# Patient Record
Sex: MALE | Race: WHITE | NOT HISPANIC OR LATINO | Employment: FULL TIME | URBAN - METROPOLITAN AREA
[De-identification: names, ages, dates, MRNs, and addresses within clinical notes are randomized per-mention and may not be internally consistent; named-entity substitution may affect disease eponyms.]

---

## 2017-04-21 ENCOUNTER — LAB VISIT (OUTPATIENT)
Dept: LAB | Facility: HOSPITAL | Age: 42
End: 2017-04-21
Attending: FAMILY MEDICINE
Payer: COMMERCIAL

## 2017-04-21 ENCOUNTER — OFFICE VISIT (OUTPATIENT)
Dept: FAMILY MEDICINE | Facility: CLINIC | Age: 42
End: 2017-04-21
Attending: FAMILY MEDICINE
Payer: COMMERCIAL

## 2017-04-21 VITALS
OXYGEN SATURATION: 99 % | DIASTOLIC BLOOD PRESSURE: 76 MMHG | BODY MASS INDEX: 21.09 KG/M2 | HEART RATE: 64 BPM | SYSTOLIC BLOOD PRESSURE: 106 MMHG | HEIGHT: 70 IN | TEMPERATURE: 98 F | WEIGHT: 147.31 LBS

## 2017-04-21 DIAGNOSIS — Z00.00 LABORATORY EXAM ORDERED AS PART OF ROUTINE GENERAL MEDICAL EXAMINATION: ICD-10-CM

## 2017-04-21 DIAGNOSIS — Z00.00 ROUTINE GENERAL MEDICAL EXAMINATION AT A HEALTH CARE FACILITY: Primary | ICD-10-CM

## 2017-04-21 LAB
ALBUMIN SERPL BCP-MCNC: 4.2 G/DL
ALP SERPL-CCNC: 81 U/L
ALT SERPL W/O P-5'-P-CCNC: 18 U/L
ANION GAP SERPL CALC-SCNC: 6 MMOL/L
AST SERPL-CCNC: 23 U/L
BASOPHILS # BLD AUTO: 0.03 K/UL
BASOPHILS NFR BLD: 0.6 %
BILIRUB SERPL-MCNC: 1.9 MG/DL
BUN SERPL-MCNC: 14 MG/DL
CALCIUM SERPL-MCNC: 9.6 MG/DL
CHLORIDE SERPL-SCNC: 100 MMOL/L
CHOLEST/HDLC SERPL: 4.2 {RATIO}
CO2 SERPL-SCNC: 30 MMOL/L
COMPLEXED PSA SERPL-MCNC: 1.1 NG/ML
CREAT SERPL-MCNC: 0.8 MG/DL
DIFFERENTIAL METHOD: ABNORMAL
EOSINOPHIL # BLD AUTO: 0.3 K/UL
EOSINOPHIL NFR BLD: 5.6 %
ERYTHROCYTE [DISTWIDTH] IN BLOOD BY AUTOMATED COUNT: 13.1 %
EST. GFR  (AFRICAN AMERICAN): >60 ML/MIN/1.73 M^2
EST. GFR  (NON AFRICAN AMERICAN): >60 ML/MIN/1.73 M^2
GLUCOSE SERPL-MCNC: 84 MG/DL
HCT VFR BLD AUTO: 48.3 %
HDL/CHOLESTEROL RATIO: 23.6 %
HDLC SERPL-MCNC: 250 MG/DL
HDLC SERPL-MCNC: 59 MG/DL
HGB BLD-MCNC: 16.9 G/DL
LDLC SERPL CALC-MCNC: 162 MG/DL
LYMPHOCYTES # BLD AUTO: 1.5 K/UL
LYMPHOCYTES NFR BLD: 28.9 %
MCH RBC QN AUTO: 33.1 PG
MCHC RBC AUTO-ENTMCNC: 35 %
MCV RBC AUTO: 95 FL
MONOCYTES # BLD AUTO: 0.4 K/UL
MONOCYTES NFR BLD: 8.8 %
NEUTROPHILS # BLD AUTO: 2.8 K/UL
NEUTROPHILS NFR BLD: 55.7 %
NONHDLC SERPL-MCNC: 191 MG/DL
PLATELET # BLD AUTO: 226 K/UL
PMV BLD AUTO: 9.9 FL
POTASSIUM SERPL-SCNC: 4.8 MMOL/L
PROT SERPL-MCNC: 7.6 G/DL
RBC # BLD AUTO: 5.11 M/UL
SODIUM SERPL-SCNC: 136 MMOL/L
T4 FREE SERPL-MCNC: 0.95 NG/DL
TRIGL SERPL-MCNC: 145 MG/DL
TSH SERPL DL<=0.005 MIU/L-ACNC: 1.79 UIU/ML
WBC # BLD AUTO: 5.02 K/UL

## 2017-04-21 PROCEDURE — 90471 IMMUNIZATION ADMIN: CPT | Mod: S$GLB,,, | Performed by: FAMILY MEDICINE

## 2017-04-21 PROCEDURE — 80061 LIPID PANEL: CPT

## 2017-04-21 PROCEDURE — 85025 COMPLETE CBC W/AUTO DIFF WBC: CPT

## 2017-04-21 PROCEDURE — 84153 ASSAY OF PSA TOTAL: CPT

## 2017-04-21 PROCEDURE — 99386 PREV VISIT NEW AGE 40-64: CPT | Mod: S$GLB,,, | Performed by: FAMILY MEDICINE

## 2017-04-21 PROCEDURE — 90715 TDAP VACCINE 7 YRS/> IM: CPT | Mod: S$GLB,,, | Performed by: FAMILY MEDICINE

## 2017-04-21 PROCEDURE — 36415 COLL VENOUS BLD VENIPUNCTURE: CPT | Mod: PO

## 2017-04-21 PROCEDURE — 80053 COMPREHEN METABOLIC PANEL: CPT

## 2017-04-21 PROCEDURE — 84439 ASSAY OF FREE THYROXINE: CPT

## 2017-04-21 PROCEDURE — 84443 ASSAY THYROID STIM HORMONE: CPT

## 2017-04-21 PROCEDURE — 99999 PR PBB SHADOW E&M-NEW PATIENT-LVL III: CPT | Mod: PBBFAC,,, | Performed by: FAMILY MEDICINE

## 2017-04-21 NOTE — PROGRESS NOTES
Subjective:       Patient ID: Zander Tovar is a 42 y.o. male.    Chief Complaint: Annual Exam    HPI     The patient presents to the office today requesting a routine periodic health examination.  This is his first visit with me.    There is no problem list on file for this patient.      Past Surgical History:   Procedure Laterality Date    CAUTERY OF TURBINATES      DENTAL SURGERY         No current outpatient prescriptions on file.    Review of patient's allergies indicates:  No Known Allergies    Family History   Problem Relation Age of Onset    Diabetes Paternal Grandfather        Social History     Social History    Marital status: Single     Spouse name: N/A    Number of children: 0    Years of education: N/A     Occupational History    teacher      [employer]     Social History Main Topics    Smoking status: Never Smoker    Smokeless tobacco: Former User     Types: Snuff    Alcohol use 6.0 oz/week     5 Cans of beer, 5 Glasses of wine per week    Drug use: Not on file    Sexual activity: Yes     Partners: Female     Other Topics Concern    Not on file     Social History Narrative    The patient does exercise regularly (jog/light resistance: 5days/wk).      He is satisfied with weight.    Rates diet as good.    He does drink at least 1/2 gallon water daily.    He drinks 3-4 coffee/tea/caffeine-containing soft drinks daily.    Total sleep time at night is 6 hours.    He works 40-50 hours per week.    He does wear seat belts.    Hobbies include basketball.           Patient Care Team:  Colt Valenzuela Jr., MD as PCP - General (Family Medicine)    Review of Systems   Constitutional: Negative for fatigue and unexpected weight change.   HENT: Negative for ear discharge, ear pain, hearing loss, tinnitus and voice change.    Eyes: Negative for pain.   Respiratory: Negative for cough and shortness of breath.    Cardiovascular: Negative for chest pain, palpitations and leg swelling.   Gastrointestinal:  Negative for abdominal pain, blood in stool, constipation, diarrhea, nausea and vomiting.   Genitourinary: Negative for decreased urine volume, difficulty urinating, dysuria, enuresis, frequency, hematuria and urgency.   Musculoskeletal: Negative for arthralgias, back pain and myalgias.   Skin: Negative for rash.   Neurological: Negative for dizziness, weakness, light-headedness and headaches.   Hematological: Does not bruise/bleed easily.   Psychiatric/Behavioral: Positive for sleep disturbance (occ insomnia: both induction and early awakenings). Negative for dysphoric mood. The patient is not nervous/anxious.        Objective:      Physical Exam   Constitutional: He is oriented to person, place, and time. He appears well-developed and well-nourished. He is cooperative.   HENT:   Head: Normocephalic and atraumatic.   Right Ear: External ear normal.   Left Ear: External ear normal.   Nose: Nose normal.   Mouth/Throat: Oropharynx is clear and moist and mucous membranes are normal. No oropharyngeal exudate.   Eyes: Conjunctivae are normal. No scleral icterus.   Neck: Neck supple. No JVD present. Carotid bruit is not present. No thyromegaly present.   Cardiovascular: Normal rate, regular rhythm, normal heart sounds and normal pulses.  Exam reveals no gallop and no friction rub.    No murmur heard.  Pulmonary/Chest: Effort normal and breath sounds normal. He has no wheezes. He has no rhonchi. He has no rales.   Abdominal: Soft. Bowel sounds are normal. He exhibits no distension and no mass. There is no splenomegaly or hepatomegaly. There is no tenderness.   Musculoskeletal: Normal range of motion. He exhibits no edema or tenderness.   Lymphadenopathy:     He has no cervical adenopathy.     He has no axillary adenopathy.   Neurological: He is alert and oriented to person, place, and time. He has normal strength and normal reflexes. No cranial nerve deficit or sensory deficit. Coordination normal.   Skin: Skin is warm and  "dry.   Psychiatric: He has a normal mood and affect.   Vitals reviewed.        Assessment:       1. Routine general medical examination at a health care facility    2. Laboratory exam ordered as part of routine general medical examination          Plan:       Laboratory investigation, including diabetes and thyroid screening, serum chemistries, and lipid profile.  Discussed routine examinations and screenings.   TdaP today.   Discussed with patient the importance of lifestyle modifications, including well-balanced diet and moderate exercise regimen, in reducing risk for cardiovascular/cerebrovascular disease and diabetes.  We will call the patient with results & make further recommendations at that time.        "This note will not be shared with the patient."  "

## 2017-04-21 NOTE — MR AVS SNAPSHOT
"    [address], [address]  [facility] [address]  Phone: [phone]                  Zander Tovar   2017 8:00 AM   Office Visit    Description:  Male : 1975   Provider:  Colt Valenzuela Jr., MD   Department:  [facility]           Reason for Visit     Annual Exam           Diagnoses this Visit        Comments    Routine general medical examination at a health care facility    -  Primary     Laboratory exam ordered as part of routine general medical examination                To Do List           Goals (5 Years of Data)     None      [facility]     [facility] On Call Nurse Care Line -  Assistance  Unless otherwise directed by your provider, please contact [facility], our nurse care line that is available for  assistance.     Registered nurses in the [facility] On Call Center provide: appointment scheduling, clinical advisement, health education, and other advisory services.  Call: [phone] (toll free)               Medications           Message regarding Medications     Verify the changes and/or additions to your medication regime listed below are the same as discussed with your clinician today.  If any of these changes or additions are incorrect, please notify your healthcare provider.             Verify that the below list of medications is an accurate representation of the medications you are currently taking.  If none reported, the list may be blank. If incorrect, please contact your healthcare provider. Carry this list with you in case of emergency.                Clinical Reference Information           Your Vitals Were     BP Pulse Temp Height Weight SpO2    106/76 (BP Location: Left arm, Patient Position: Sitting, BP Method: Manual) 64 97.7 °F (36.5 °C) (Oral) 5' 9.5" (1.765 m) 66.8 kg (147 lb 4.8 oz) 99%    BMI                21.44 kg/m2          Blood Pressure          Most Recent Value    BP  106/76    "   Allergies as of 4/21/2017     No Known Allergies      Immunizations Administered on Date of Encounter - 4/21/2017     Name Date Dose VIS Date Route    TDAP  Incomplete 0.5 mL 2/24/2015 Intramuscular      Orders Placed During Today's Visit      Normal Orders This Visit    Tdap Vaccine     Future Labs/Procedures Expected by Expires    CBC auto differential  4/21/2017 4/22/2018    Comprehensive metabolic panel  4/21/2017 4/22/2018    Lipid panel  4/21/2017 4/22/2018    PSA, Screening  4/21/2017 4/22/2018    T4, free  4/21/2017 4/21/2018    TSH  4/21/2017 4/21/2018      Language Assistance Services     ATTENTION: Language assistance services are available, free of charge. Please call [phone].      ATENCIÓN: Si jaqui rojas, tiene a ibanez disposición servicios gratuitos de asistencia lingüística. Llame al [phone].     CHÚ Ý: N?u b?n nói Ti?ng Vi?t, có các d?ch v? h? tr? ngôn ng? mi?n phí dành cho b?n. G?i s? [phone].         [facility] complies with applicable Federal civil rights laws and does not discriminate on the basis of race, color, national origin, age, disability, or sex.

## 2017-04-21 NOTE — PROGRESS NOTES
Patient was given Tdap IM in Left Deltoid  as per orders from Dr. Valenzuela. Aseptic tech used and pt tolerated well. Pt was monitored for 15 mins with no reaction noted.

## 2017-04-22 ENCOUNTER — PATIENT MESSAGE (OUTPATIENT)
Dept: FAMILY MEDICINE | Facility: CLINIC | Age: 42
End: 2017-04-22

## 2017-04-22 PROBLEM — E78.00 HYPERCHOLESTEREMIA: Status: ACTIVE | Noted: 2017-04-22

## 2017-04-22 PROBLEM — Z91.89 FRAMINGHAM CARDIAC RISK <10% IN NEXT 10 YEARS: Status: ACTIVE | Noted: 2017-04-22

## 2017-05-19 ENCOUNTER — OFFICE VISIT (OUTPATIENT)
Dept: PSYCHIATRY | Facility: CLINIC | Age: 42
End: 2017-05-19
Payer: COMMERCIAL

## 2017-05-19 DIAGNOSIS — F41.1 ANXIETY STATE: Primary | ICD-10-CM

## 2017-05-19 PROCEDURE — 90791 PSYCH DIAGNOSTIC EVALUATION: CPT | Mod: S$GLB,,, | Performed by: SOCIAL WORKER

## 2017-05-19 NOTE — PROGRESS NOTES
Psychiatry Initial Visit (PhD/LCSW)  Diagnostic Interview - CPT 51853    Date: 5/19/2017    Site: [facility]    Referral source: self     Clinical status of patient: Outpatient    Zander Tovar, a 42 y.o. male, for initial evaluation visit.  Met with patient.    Chief complaint/reason for encounter: gfriend told him to come.  He is not sure her concern or his.  That she has seen a change in him.  He asked her what it is and she said she can not explain it.       History of present illness:     Pain: noncontributory    Symptoms:   · Mood:    Pt has no hx of suicide and has no thoughts of suicide.   He has insomnia for years.  It goes for days at a time and then goes away.   Exercise improves.  Mood is a little above neutral; however his concerns about fiance are making him feel down at times.  Furthermore he expresses concerns of revealing his stress to her as she is so depressed,  Energy is high, motivation is ebbs and flaws.    Concentration is normal,   Self critical.    ·    · Anxiety: no hx of trauma,  Worrier, mild irritability, no muscle tension, restless.  Some shyness,  Some avoidance.    · No attention problem.    · Substance abuse: take more than intended and use despite negative consquences.  · Cognitive functioning: denied  · Health behaviors: noncontributory    Psychiatric history: as undergrad.  at [employer] few sessions.   couples thearpy just one meeting.  no meds.        Medical history: none    Family history of psychiatric illness: sister ?      maternal grandfather etoh.  Greatuncles.  Pt has identical twin with alcohol issues.     Social history (marriage, employment, etc.):     Fiance year and half.   She is losing a friend to colon cancer.   She had a falling out from her family.  She is clinically depressed and in therapy.  He is teaching English,  At [employer].  He is working on the dissertation with [employer] for his PhD.   Very happy childhood.   A best friend  committed suicide 20 years ago.     Joycelyn is in therapy.     Both can get overly angry.  No physical.     He is committed to her.    That they are both similar  Humor, she is a musician.  Both solitary people,  They both read together.       Substance use:   Alcohol: about every two weeks wine beer.  gets intoxication.   once dui.  tends to drink for a few days.  Is like why not during those days.   Has been a concern with his gfriend.   Has succeeded in stopping he reports.   Tolerance is difficult,  No hangovers,  Is aware that has an effect in him detrimentally.     Drugs: none   Tobacco: none   Caffeine: 3 cups caffeine.      Current medications and drug reactions (include OTC, herbal): none     Strengths and liabilities: Strength: Patient accepts guidance/feedback, Strength: Patient is expressive/articulate., Strength: Patient is intelligent.    Current Evaluation:     Mental Status Exam:  General Appearance:  unremarkable, age appropriate   Speech: normal tone, normal rate, normal pitch, normal volume      Level of Cooperation: cooperative      Thought Processes: normal and logical   Mood: euthymic      Thought Content: normal, no suicidality, no homicidality, delusions, or paranoia   Affect: congruent and appropriate   Orientation: Oriented x3   Memory: recent >  intact   Attention Span & Concentration: intact   Fund of General Knowledge: intact and appropriate to age and level of education   Abstract Reasoning: interpretation of similarities was abstract, interpretation of proverbs was abstract   Judgment & Insight: good, fair     Language  intact     Diagnostic Impression - Plan:       ICD-10-CM ICD-9-CM   1. Anxiety state F41.1 300.00       Plan:individual psychotherapy    Return to Clinic: 1 week    Length of Service (minutes): 45

## 2017-05-30 ENCOUNTER — OFFICE VISIT (OUTPATIENT)
Dept: FAMILY MEDICINE | Facility: CLINIC | Age: 42
End: 2017-05-30
Attending: FAMILY MEDICINE
Payer: COMMERCIAL

## 2017-05-30 VITALS
HEIGHT: 70 IN | HEART RATE: 79 BPM | BODY MASS INDEX: 20.04 KG/M2 | OXYGEN SATURATION: 97 % | DIASTOLIC BLOOD PRESSURE: 64 MMHG | WEIGHT: 140 LBS | SYSTOLIC BLOOD PRESSURE: 108 MMHG

## 2017-05-30 DIAGNOSIS — K13.70 ORAL LESION: Primary | ICD-10-CM

## 2017-05-30 PROCEDURE — 99999 PR PBB SHADOW E&M-EST. PATIENT-LVL III: CPT | Mod: PBBFAC,,, | Performed by: FAMILY MEDICINE

## 2017-05-30 PROCEDURE — 99212 OFFICE O/P EST SF 10 MIN: CPT | Mod: S$GLB,,, | Performed by: FAMILY MEDICINE

## 2017-05-30 PROCEDURE — 87102 FUNGUS ISOLATION CULTURE: CPT

## 2017-05-30 NOTE — PROGRESS NOTES
"The patient presents to the office, requesting a "fungal culture of my mouth."  He states that his girlfriend is adamant that he has a "fungal infection in my mouth."  He is requesting a culture to satisfy curiosity.    Examination reveals pink, nonerythematous, moist oral mucosa without any lesions.  Pharyngeal exam is unremarkable.  No adenopathy noted.    Fungal culture obtained from oral mucosa.  Await culture.  "

## 2017-06-13 ENCOUNTER — PATIENT MESSAGE (OUTPATIENT)
Dept: FAMILY MEDICINE | Facility: CLINIC | Age: 42
End: 2017-06-13

## 2017-06-15 ENCOUNTER — PATIENT MESSAGE (OUTPATIENT)
Dept: FAMILY MEDICINE | Facility: CLINIC | Age: 42
End: 2017-06-15

## 2017-06-16 ENCOUNTER — OFFICE VISIT (OUTPATIENT)
Dept: PSYCHIATRY | Facility: CLINIC | Age: 42
End: 2017-06-16
Payer: COMMERCIAL

## 2017-06-16 DIAGNOSIS — F41.1 ANXIETY STATE: Primary | ICD-10-CM

## 2017-06-16 PROCEDURE — 90834 PSYTX W PT 45 MINUTES: CPT | Mod: S$GLB,,, | Performed by: SOCIAL WORKER

## 2017-06-16 NOTE — PROGRESS NOTES
Individual Psychotherapy (PhD/LCSW)    6/16/2017    Site:  [facility]         Therapeutic Intervention: Met with patient.  Outpatient - Insight oriented psychotherapy 45 min - CPT code 53111    Chief complaint/reason for encounter: anxiety   Lion Simth    Interval history and content of current session:    Pt reports lion has become distant this year but she believes he has changed.  Pt is puzzled about this.  Pt reports that he feels happy some days and yet lion says  I can tell something is going on with you.   Pt has known her for 1 1/2      They are similar in style.  Similar music both yard work.  Read together.  Humor.          Two previous relation.  Both he was hurt. First he left per infidelity.  Second she left after getting a fellowship [place].  But she did not seem interested in him coming anyway.      He likes to be around strong independent women.  Sarah more independent.   Sarah used to cut.  Has attempted suicide.  Passionate.     Insomnia two coffees and sometimes a third one.  Exercising helps this.  Currently sometimes unable per work.  Does not like gym so running is prohibiting now that is so hot.       Pt drinking significantly less.      Treatment plan:  · Target symptoms: anxiety   · Why chosen therapy is appropriate versus another modality: relevant to diagnosis  · Outcome monitoring methods: self-report, observation  · Therapeutic intervention type: insight oriented psychotherapy    Risk parameters:  Patient reports no suicidal ideation  Patient reports no homicidal ideation  Patient reports no self-injurious behavior  Patient reports no violent behavior    Verbal deficits: None    Patient's response to intervention:  The patient's response to intervention is accepting, motivated.    Progress toward goals and other mental status changes:  The patient's progress toward goals is good.    Diagnosis:   No diagnosis found.    Plan:  individual  psychotherapy    Return to clinic: 1 month    Length of Service (minutes): 45

## 2017-07-04 LAB — FUNGUS SPEC CULT: NORMAL

## 2017-08-16 ENCOUNTER — OFFICE VISIT (OUTPATIENT)
Dept: PSYCHIATRY | Facility: CLINIC | Age: 42
End: 2017-08-16
Payer: COMMERCIAL

## 2017-08-16 DIAGNOSIS — Z63.0 PARTNER RELATIONAL PROBLEM: ICD-10-CM

## 2017-08-16 DIAGNOSIS — F41.1 ANXIETY STATE: Primary | ICD-10-CM

## 2017-08-16 PROCEDURE — 90847 FAMILY PSYTX W/PT 50 MIN: CPT | Mod: S$GLB,,, | Performed by: PSYCHOLOGIST

## 2017-08-16 SDOH — SOCIAL DETERMINANTS OF HEALTH (SDOH): PROBLEMS IN RELATIONSHIP WITH SPOUSE OR PARTNER: Z63.0

## 2017-08-16 NOTE — PROGRESS NOTES
Family Psychotherapy (MD)    8/16/2017    Site: [facility]    Length of service: 60    Therapeutic intervention: 90737-Family therapy with patient; needed because relationship/communication problems with lion    Persons present: patient and significant other     Interval history:  Pt has been seeing PAUL Figueroa for individual psychotherapy. He and lion were referred by her therapist, Curtis Marroquin, Ph.D for family therapy. Couple have been working through very challenging communication and trust issues. Pt was able to explain to lion that he had been unable to be open with her about difficulty with his twin brother because he was afraid that it might turn against his twin. Lion felt misunderstood and hurt that pt would make such assumptions about her. During the session, we were able to look at this dynamic and develop a better strategy for them to communicate about feelings without making assumptions or assigning blame.      Target symptoms: anxiety      Patient's interpersonal/verbal exchanges: 90407-Family therapy with patient:  active listening and self-disclosure    Progress toward goals: progressing slowly    Diagnosis: Anxiety, nos; partner relational problem     Plan: family psychotherapy    Return to clinic: as scheduled

## 2017-08-17 ENCOUNTER — OFFICE VISIT (OUTPATIENT)
Dept: PSYCHIATRY | Facility: CLINIC | Age: 42
End: 2017-08-17
Payer: COMMERCIAL

## 2017-08-17 DIAGNOSIS — F41.1 ANXIETY STATE: Primary | ICD-10-CM

## 2017-08-17 PROCEDURE — 90834 PSYTX W PT 45 MINUTES: CPT | Mod: S$GLB,,, | Performed by: SOCIAL WORKER

## 2017-08-17 NOTE — PROGRESS NOTES
Individual Psychotherapy (PhD/LCSW)    8/17/2017    Site:  [facility]         Therapeutic Intervention: Met with patient.  Outpatient - Insight oriented psychotherapy 45 min - CPT code 39342    Chief complaint/reason for encounter: anxiety   Lion Smith    Interval history and content of current session:    He discussed his stress in his current relationship.  He has had two previous difficult relations.  They all have certain commonality.  He denies suicidal ideation.  Tells me girlfriend had suicidal ideation about one mo. Ago.      Reports she is not being intimate much.  That she has side effects from her antidepressant and that she says his smell has changed.  He has started a different diet at her urgency to change his smell and went to physician for a swab to detect candida.  Swab was negative but she tends to differ in his status.  He seems more serious than before and there is minor trembling in his hand.      Treatment plan:  · Target symptoms: anxiety   · Why chosen therapy is appropriate versus another modality: relevant to diagnosis  · Outcome monitoring methods: self-report, observation  · Therapeutic intervention type: insight oriented psychotherapy    Risk parameters:  Patient reports no suicidal ideation  Patient reports no homicidal ideation  Patient reports no self-injurious behavior  Patient reports no violent behavior    Verbal deficits: None    Patient's response to intervention:  The patient's response to intervention is accepting, motivated.    Progress toward goals and other mental status changes:  The patient's progress toward goals is good.    Diagnosis:   No diagnosis found.    Plan:  individual psychotherapy    Return to clinic: 1 month    Length of Service (minutes): 45

## 2017-08-22 ENCOUNTER — OFFICE VISIT (OUTPATIENT)
Dept: FAMILY MEDICINE | Facility: CLINIC | Age: 42
End: 2017-08-22
Attending: FAMILY MEDICINE
Payer: COMMERCIAL

## 2017-08-22 VITALS
DIASTOLIC BLOOD PRESSURE: 74 MMHG | WEIGHT: 140.63 LBS | OXYGEN SATURATION: 98 % | SYSTOLIC BLOOD PRESSURE: 110 MMHG | HEIGHT: 70 IN | BODY MASS INDEX: 20.13 KG/M2 | HEART RATE: 73 BPM

## 2017-08-22 DIAGNOSIS — G47.00 INSOMNIA, UNSPECIFIED TYPE: ICD-10-CM

## 2017-08-22 DIAGNOSIS — H93.12 TINNITUS, LEFT: ICD-10-CM

## 2017-08-22 DIAGNOSIS — H69.92 ETD (EUSTACHIAN TUBE DYSFUNCTION), LEFT: Primary | ICD-10-CM

## 2017-08-22 PROCEDURE — 3008F BODY MASS INDEX DOCD: CPT | Mod: S$GLB,,, | Performed by: FAMILY MEDICINE

## 2017-08-22 PROCEDURE — 96372 THER/PROPH/DIAG INJ SC/IM: CPT | Mod: S$GLB,,, | Performed by: FAMILY MEDICINE

## 2017-08-22 PROCEDURE — 99999 PR PBB SHADOW E&M-EST. PATIENT-LVL III: CPT | Mod: PBBFAC,,, | Performed by: FAMILY MEDICINE

## 2017-08-22 PROCEDURE — 99214 OFFICE O/P EST MOD 30 MIN: CPT | Mod: 25,S$GLB,, | Performed by: FAMILY MEDICINE

## 2017-08-22 RX ORDER — DOXEPIN HYDROCHLORIDE 10 MG/1
10 CAPSULE ORAL NIGHTLY
Qty: 10 CAPSULE | Refills: 0 | Status: SHIPPED | OUTPATIENT
Start: 2017-08-22 | End: 2018-08-22

## 2017-08-22 RX ORDER — GUAIFENESIN AND PSEUDOEPHEDRINE HCL 1200; 120 MG/1; MG/1
1 TABLET, EXTENDED RELEASE ORAL 2 TIMES DAILY
Qty: 20 EACH | Refills: 0 | Status: SHIPPED | OUTPATIENT
Start: 2017-08-22

## 2017-08-22 RX ORDER — METHYLPREDNISOLONE ACETATE 40 MG/ML
40 INJECTION, SUSPENSION INTRA-ARTICULAR; INTRALESIONAL; INTRAMUSCULAR; SOFT TISSUE
Status: COMPLETED | OUTPATIENT
Start: 2017-08-22 | End: 2017-08-22

## 2017-08-22 RX ADMIN — METHYLPREDNISOLONE ACETATE 40 MG: 40 INJECTION, SUSPENSION INTRA-ARTICULAR; INTRALESIONAL; INTRAMUSCULAR; SOFT TISSUE at 11:08

## 2017-08-22 NOTE — PROGRESS NOTES
Patient was given Depo Medrol IM injection in the Left Ventrogluteal as per orders from Dr. Valenzuela. Aseptic technique was used. Patient was monitored for 15 mins with no reaction noted. Patient tolerated well.

## 2017-08-22 NOTE — PROGRESS NOTES
Subjective:       Patient ID: Zander Tovar is a 42 y.o. male.    Chief Complaint: Tinnitus    Ringing in Ears:   Chronicity:  New  Onset:  More than 1 month ago  Progression since onset:  Gradually worsening  Frequency:  Constantly  Pain scale:  0/10  Severity:  Moderate  Ringing in ear characteristics:  Stable   Associated symptoms: dizziness (very brief w/change in position), tinnitus and buzzing.  No vertigo, no ear congestion, no ear pain, no fever, no headaches, no rhinorrhea, no aural fullness, no otalgia, no otorrhea, no facial weakness and no visual disturbances.  Risk factors: none.  Treatments tried:  Nothing   PMH includes: dizziness (very brief w/change in position).  No noise exposure, no ear surgery, no ear tubes and no ototoxic drugs.    Patient Active Problem List   Diagnosis    [place] cardiac risk <10% in next 10 years    Hypercholesteremia       MEDS: None    Review of Systems   Constitutional: Negative for activity change, fever and unexpected weight change.   HENT: Positive for tinnitus. Negative for ear pain, hearing loss, rhinorrhea and trouble swallowing.    Eyes: Negative for discharge and visual disturbance.   Respiratory: Negative for chest tightness and wheezing.    Cardiovascular: Negative for chest pain and palpitations.   Gastrointestinal: Negative for blood in stool, constipation, diarrhea and vomiting.   Endocrine: Negative for polydipsia and polyuria.   Genitourinary: Negative for difficulty urinating, hematuria and urgency.   Musculoskeletal: Negative for arthralgias, joint swelling and neck pain.   Neurological: Positive for dizziness (very brief w/change in position). Negative for vertigo, weakness and headaches.   Psychiatric/Behavioral: Positive for sleep disturbance (difficulty with induction and early awakenings). Negative for confusion and dysphoric mood.       Objective:      Physical Exam   Constitutional: He is oriented to person, place, and time. He appears  "well-developed and well-nourished.   HENT:   Head: Normocephalic and atraumatic.   Right Ear: Tympanic membrane, external ear and ear canal normal.   Left Ear: External ear and ear canal normal. Tympanic membrane is erythematous ("pink") and bulging. Tympanic membrane mobility is abnormal.   Nose: Mucosal edema present. No rhinorrhea. Right sinus exhibits no maxillary sinus tenderness and no frontal sinus tenderness. Left sinus exhibits no maxillary sinus tenderness and no frontal sinus tenderness.   Eyes: Conjunctivae are normal. No scleral icterus.   Neck: Neck supple.   Cardiovascular: Normal rate, regular rhythm and normal heart sounds.  Exam reveals no gallop.    No murmur heard.  Pulmonary/Chest: Effort normal and breath sounds normal. He has no wheezes. He has no rales.   Musculoskeletal: He exhibits no edema.   Lymphadenopathy:     He has no cervical adenopathy.   Neurological: He is alert and oriented to person, place, and time. He has normal strength. No cranial nerve deficit. He displays a negative Romberg sign. Coordination normal.   Skin: Skin is warm and dry.   Psychiatric: He has a normal mood and affect.   Vitals reviewed.        Assessment:       1. ETD (eustachian tube dysfunction), left    2. Tinnitus, left        Plan:       Mucinex-D.  Increase fluids.  DepoMedrol 40mg IM today.  Follow-up by phone/email in a few days.         "This note will not be shared with the patient."  "

## 2017-09-01 ENCOUNTER — OFFICE VISIT (OUTPATIENT)
Dept: PSYCHIATRY | Facility: CLINIC | Age: 42
End: 2017-09-01
Payer: COMMERCIAL

## 2017-09-01 DIAGNOSIS — F41.1 ANXIETY STATE: Primary | ICD-10-CM

## 2017-09-01 DIAGNOSIS — Z63.0 PARTNER RELATIONAL PROBLEM: ICD-10-CM

## 2017-09-01 PROCEDURE — 90847 FAMILY PSYTX W/PT 50 MIN: CPT | Mod: S$GLB,,, | Performed by: PSYCHOLOGIST

## 2017-09-01 SDOH — SOCIAL DETERMINANTS OF HEALTH (SDOH): PROBLEMS IN RELATIONSHIP WITH SPOUSE OR PARTNER: Z63.0

## 2017-09-01 NOTE — PROGRESS NOTES
Family Psychotherapy (MD)    9/1/2017    Site: [facility]    Length of service: 60    Therapeutic intervention: 90847-Family therapy with patient; needed because relationship/communication problems with fielli    Persons present: patient and significant other     Interval history:  Couple seen. They came into session very much at odds with each other. Lion c/o that pt may have been drinking again. She also c/o that he was unresponsive to her request for a hug on the anniversary of Shena and he became defensive. He saw her as having unrealistic expectations. The resulting conflict escalated to the point where she refused to answer his texts or have contact with him. During the session we discussed how either might have changed their response to the other with the result of a better outcome. At the end of the session she said that she wanted to end the relationship as a couple and she hoped they could become friends. I asked that each reflect on their feeling and try to write the other a letter about same (not a text) to clarify where each stood. They are unclear whether they will come back to see me as scheduled next week.      Target symptoms: anxiety      Patient's interpersonal/verbal exchanges: 90847-Family therapy with patient:  active listening, self-disclosure, argumentative     Progress toward goals: progressing slowly    Diagnosis: Anxiety, nos; partner relational problem     Plan: family psychotherapy    Return to clinic: as scheduled

## 2017-09-11 ENCOUNTER — PATIENT MESSAGE (OUTPATIENT)
Dept: PSYCHIATRY | Facility: CLINIC | Age: 42
End: 2017-09-11

## 2021-04-26 ENCOUNTER — PATIENT MESSAGE (OUTPATIENT)
Dept: RESEARCH | Facility: HOSPITAL | Age: 46
End: 2021-04-26